# Patient Record
(demographics unavailable — no encounter records)

---

## 2024-11-13 NOTE — HISTORY OF PRESENT ILLNESS
[FreeTextEntry1] : 33yo, G0, LMP 10/20/24, late taking pills 2 days ago spotting x few days 11/2/24, post coital.

## 2024-11-13 NOTE — HISTORY OF PRESENT ILLNESS
[FreeTextEntry1] : 35yo, G0, LMP 10/20/24, late taking pills 2 days ago spotting x few days 11/2/24, post coital.